# Patient Record
Sex: MALE | Race: WHITE | Employment: OTHER | URBAN - METROPOLITAN AREA
[De-identification: names, ages, dates, MRNs, and addresses within clinical notes are randomized per-mention and may not be internally consistent; named-entity substitution may affect disease eponyms.]

---

## 2020-06-08 ENCOUNTER — OFFICE VISIT (OUTPATIENT)
Dept: ORTHOPEDIC SURGERY | Age: 75
End: 2020-06-08
Payer: MEDICARE

## 2020-06-08 VITALS — BODY MASS INDEX: 34.82 KG/M2 | WEIGHT: 280 LBS | HEIGHT: 75 IN

## 2020-06-08 PROCEDURE — G8419 CALC BMI OUT NRM PARAM NOF/U: HCPCS | Performed by: PHYSICAL MEDICINE & REHABILITATION

## 2020-06-08 PROCEDURE — 4040F PNEUMOC VAC/ADMIN/RCVD: CPT | Performed by: PHYSICAL MEDICINE & REHABILITATION

## 2020-06-08 PROCEDURE — G8427 DOCREV CUR MEDS BY ELIG CLIN: HCPCS | Performed by: PHYSICAL MEDICINE & REHABILITATION

## 2020-06-08 PROCEDURE — 99203 OFFICE O/P NEW LOW 30 MIN: CPT | Performed by: PHYSICAL MEDICINE & REHABILITATION

## 2020-06-08 PROCEDURE — 1036F TOBACCO NON-USER: CPT | Performed by: PHYSICAL MEDICINE & REHABILITATION

## 2020-06-08 PROCEDURE — 1123F ACP DISCUSS/DSCN MKR DOCD: CPT | Performed by: PHYSICAL MEDICINE & REHABILITATION

## 2020-06-08 PROCEDURE — 3017F COLORECTAL CA SCREEN DOC REV: CPT | Performed by: PHYSICAL MEDICINE & REHABILITATION

## 2020-06-08 RX ORDER — CANDESARTAN 8 MG/1
TABLET ORAL
COMMUNITY
Start: 2020-03-29

## 2020-06-08 RX ORDER — METHYLPREDNISOLONE 4 MG/1
TABLET ORAL
Qty: 1 KIT | Refills: 0 | Status: SHIPPED | OUTPATIENT
Start: 2020-06-08 | End: 2020-06-14

## 2020-06-08 NOTE — PROGRESS NOTES
Take  by mouth., Disp: , Rfl:     vitamin D (CHOLECALCIFEROL) 1000 UNIT TABS tablet, Take 1,000 Units by mouth daily. , Disp: , Rfl:     diphenhydrAMINE (BENADRYL) 25 MG tablet, Take 25 mg by mouth every 6 hours as needed. , Disp: , Rfl:     tiZANidine (ZANAFLEX) 4 MG tablet, Take 1 tablet by mouth every 8 hours as needed. , Disp: 90 tablet, Rfl: 5    docusate sodium (COLACE) 100 MG capsule, Take 100 mg by mouth 2 times daily. , Disp: , Rfl:     candesartan (ATACAND) 8 MG tablet, , Disp: , Rfl:   Allergies:  Demerol; Daljit Legato; Benazepril hcl; Benicar [olmesartan medoxomil]; Hctz; and Succinylcholine chloride  Social History:    reports that he has never smoked. He has never used smokeless tobacco. He reports current alcohol use. He reports that he does not use drugs. Family History:   Family History   Problem Relation Age of Onset    Diabetes Neg Hx     High Cholesterol Neg Hx     High Blood Pressure Neg Hx        REVIEW OF SYSTEMS: Full ROS noted & scanned   CONSTITUTIONAL: Denies unexplained weight loss, fevers, chills or fatigue  NEUROLOGICAL: Denies unsteady gait or progressive weakness  MUSCULOSKELETAL: Denies joint swelling or redness  PSYCHOLOGICAL: Denies anxiety, depression   SKIN: Denies skin changes, delayed healing, rash, itching   HEMATOLOGIC: Denies easy bleeding or bruising  ENDOCRINE: Denies excessive thirst, urination, heat/cold  RESPIRATORY: Denies current dyspnea, cough  GI: Denies nausea, vomiting, diarrhea   : Denies bowel or bladder issues       PHYSICAL EXAM:    Vitals: Height 6' 3\" (1.905 m), weight 280 lb (127 kg). GENERAL EXAM:  · General Apparence: Patient is adequately groomed with no evidence of malnutrition. · Orientation: The patient is oriented to time, place and person. · Mood & Affect:The patient's mood and affect are appropriate   · Vascular: Examination reveals no swelling tenderness in upper or lower extremities.  Good capillary refill  · Lymphatic: The lymphatic examination bilaterally reveals all areas to be without enlargement or induration  · Sensation: Sensation is intact without deficit  · Coordination/Balance: Good coordination       LUMBAR/SACRAL EXAMINATION:  · Inspection: Local inspection shows no step-off or bruising. Lumbar alignment is normal.  Sagittal and Coronal balance is neutral.      · Palpation:   No evidence of tenderness at the midline. No tenderness bilaterally at the paraspinal or trochanters. There is no step-off or paraspinal spasm. · Range of Motion: Moderate loss flexion extension  · Strength:   Strength testing is 5/5 in all muscle groups tested. · Special Tests:   Straight leg raise and crossed SLR negative. Leg length and pelvis level.  0 out of 5 Shannon's signs. · Skin: There are no rashes, ulcerations or lesions. · Reflexes: Reflexes are symmetrically trace at the patellar and ankle tendons. Clonus absent bilaterally at the feet. · Gait & station: Normal heel toe gait  · Additional Examinations: Diminished right hip range of motion on the right without pain  · RIGHT LOWER EXTREMITY: Inspection/examination of the right lower extremity does not show any tenderness, deformity or injury. Range of motion is full. There is no gross instability. There are no rashes, ulcerations or lesions. Strength and tone are normal.  ·   · LEFT LOWER EXTREMITY:  Inspection/examination of the left lower extremity does not show any tenderness, deformity or injury. Range of motion is full. There is no gross instability. There are no rashes, ulcerations or lesions. Strength and tone are normal.    Diagnostic Testin views lumbar spine 2020 show advanced discogenic spondylosis; moderate bilateral hip OA    Impression:    Advanced lumbar discogenic spondylosis and clinical bilateral neurogenic claudication with significant disability        Plan:   Medrol Dosepak.   Discussed relative immunosuppression in setting of COVID-19 crisis    MRI lumbar spine.   Follow-up after          CARLOS Matias

## 2020-06-15 ENCOUNTER — HOSPITAL ENCOUNTER (OUTPATIENT)
Dept: MRI IMAGING | Age: 75
Discharge: HOME | End: 2020-06-15
Payer: MEDICARE

## 2020-06-15 PROCEDURE — 72148 MRI LUMBAR SPINE W/O DYE: CPT

## 2020-06-24 ENCOUNTER — OFFICE VISIT (OUTPATIENT)
Dept: ORTHOPEDIC SURGERY | Age: 75
End: 2020-06-24
Payer: MEDICARE

## 2020-06-24 ENCOUNTER — TELEPHONE (OUTPATIENT)
Dept: ORTHOPEDIC SURGERY | Age: 75
End: 2020-06-24

## 2020-06-24 VITALS — BODY MASS INDEX: 34.81 KG/M2 | WEIGHT: 279.98 LBS | HEIGHT: 75 IN

## 2020-06-24 PROCEDURE — G8427 DOCREV CUR MEDS BY ELIG CLIN: HCPCS | Performed by: PHYSICAL MEDICINE & REHABILITATION

## 2020-06-24 PROCEDURE — G8417 CALC BMI ABV UP PARAM F/U: HCPCS | Performed by: PHYSICAL MEDICINE & REHABILITATION

## 2020-06-24 PROCEDURE — 3017F COLORECTAL CA SCREEN DOC REV: CPT | Performed by: PHYSICAL MEDICINE & REHABILITATION

## 2020-06-24 PROCEDURE — 1036F TOBACCO NON-USER: CPT | Performed by: PHYSICAL MEDICINE & REHABILITATION

## 2020-06-24 PROCEDURE — 99214 OFFICE O/P EST MOD 30 MIN: CPT | Performed by: PHYSICAL MEDICINE & REHABILITATION

## 2020-06-24 PROCEDURE — 4040F PNEUMOC VAC/ADMIN/RCVD: CPT | Performed by: PHYSICAL MEDICINE & REHABILITATION

## 2020-06-24 PROCEDURE — 1123F ACP DISCUSS/DSCN MKR DOCD: CPT | Performed by: PHYSICAL MEDICINE & REHABILITATION

## 2020-06-24 RX ORDER — NITROGLYCERIN 0.4 MG/1
0.4 TABLET SUBLINGUAL EVERY 5 MIN PRN
COMMUNITY
Start: 2015-05-27 | End: 2020-06-24

## 2020-06-24 RX ORDER — PANTOPRAZOLE SODIUM 40 MG/1
TABLET, DELAYED RELEASE ORAL
COMMUNITY
Start: 2020-06-06

## 2020-06-24 RX ORDER — BRIMONIDINE TARTRATE 2 MG/ML
SOLUTION/ DROPS OPHTHALMIC
COMMUNITY
Start: 2020-05-14

## 2020-06-24 RX ORDER — LATANOPROST 50 UG/ML
SOLUTION/ DROPS OPHTHALMIC
COMMUNITY
Start: 2020-06-21

## 2020-06-24 NOTE — LETTER
[facility]  Surgery Precert & Billing Form:    DEMOGRAPHICS:                                                                                                       Patient Name:  Mohini Bar  Patient :  1945   Patient SS#:      Patient Phone:  [phone] (home)  Alt.  Patient Phone:    Patient Address:  Meir [address] [place] [address] [address] [address]    PCP:  Yeny Pyle MD  Insurance: MEDICARE    DIAGNOSIS & PROCEDURE:                                                                                      Diagnosis: C81.175  Operation: bilateral L2-3 X ALFREDO #1 NEW SERIES    SURGERY  INFORMATION  Date of Surgery:   2020  Location:   [facility]  Type:    OUTPATIENT  23 hour hold:  NO  Surgeon:          Eric Almendarez MD  20     BILLING INFORMATION:                                                                                                Physician Procedure                                            CPT Codes        BILATERAL L2-3 TRANSFORAMINAL ALFREDO #1   NEW SERIES                PA, or Fellow Procedure                                      CPT Codes

## 2020-06-29 NOTE — H&P
HISTORY AND PHYSICAL/PRE-SEDATION ASSESSMENT    Patient:  Jair Leiva   :  1945  Medical Record No.:  [id number]   Date:  2020  Physician:  Sarath Madden M.D. Facility: [facility]     Nursing History and Physical reviewed and agreed upon. Additional findings:    Allergies:  Demerol; Layvonne Jaja; Benazepril hcl; Benicar [olmesartan medoxomil]; Hctz; and Succinylcholine chloride    Home Medications:    Prior to Admission medications    Medication Sig Start Date End Date Taking? Authorizing Provider   brimonidine (ALPHAGAN) 0.2 % ophthalmic solution INT 1 GTT INTO OU BID 20   Historical Provider, MD   latanoprost (XALATAN) 0.005 % ophthalmic solution  20   Historical Provider, MD   pantoprazole (PROTONIX) 40 MG tablet  20   Historical Provider, MD   candesartan (ATACAND) 8 MG tablet  3/29/20   Historical Provider, MD   atorvastatin (LIPITOR) 80 MG tablet Take 80 mg by mouth daily    Historical Provider, MD   aspirin 81 MG tablet Take 81 mg by mouth daily    Historical Provider, MD   Multiple Vitamins-Minerals (THERAPEUTIC MULTIVITAMIN-MINERALS) tablet Take 1 tablet by mouth daily    Historical Provider, MD   HYDROcodone-acetaminophen (NORCO) 5-325 MG per tablet Take 1 tablet by mouth three times daily. 4/7/15   Emily Echols MD   gabapentin (NEURONTIN) 100 MG capsule Take 1 capsule by mouth daily (before lunch). 3/12/14   Emily Echols MD   FIBER PO Take  by mouth. Historical Provider, MD   vitamin D (CHOLECALCIFEROL) 1000 UNIT TABS tablet Take 1,000 Units by mouth daily. Historical Provider, MD   diphenhydrAMINE (BENADRYL) 25 MG tablet Take 25 mg by mouth every 6 hours as needed. Historical Provider, MD   tiZANidine (ZANAFLEX) 4 MG tablet Take 1 tablet by mouth every 8 hours as needed. 13   Emily Echols MD   Needles & Syringes MISC 21gauge 1 1/2 inch.   3cc.  13  Emily Echols MD   docusate sodium (COLACE) 100 MG

## 2020-06-30 ENCOUNTER — HOSPITAL ENCOUNTER (OUTPATIENT)
Age: 75
Setting detail: OUTPATIENT SURGERY
Discharge: HOME | End: 2020-06-30
Attending: PHYSICAL MEDICINE & REHABILITATION | Admitting: PHYSICAL MEDICINE & REHABILITATION
Payer: MEDICARE

## 2020-06-30 VITALS
SYSTOLIC BLOOD PRESSURE: 154 MMHG | WEIGHT: 280 LBS | TEMPERATURE: 97.2 F | OXYGEN SATURATION: 94 % | DIASTOLIC BLOOD PRESSURE: 72 MMHG | RESPIRATION RATE: 16 BRPM | HEIGHT: 75 IN | HEART RATE: 51 BPM | BODY MASS INDEX: 34.82 KG/M2

## 2020-06-30 PROCEDURE — 2709999900 HC NON-CHARGEABLE SUPPLY: Performed by: PHYSICAL MEDICINE & REHABILITATION

## 2020-06-30 PROCEDURE — 6360000002 HC RX W HCPCS: Performed by: PHYSICAL MEDICINE & REHABILITATION

## 2020-06-30 PROCEDURE — 3600000002 HC SURGERY LEVEL 2 BASE: Performed by: PHYSICAL MEDICINE & REHABILITATION

## 2020-06-30 PROCEDURE — 2500000003 HC RX 250 WO HCPCS: Performed by: PHYSICAL MEDICINE & REHABILITATION

## 2020-06-30 PROCEDURE — 7100000010 HC PHASE II RECOVERY - FIRST 15 MIN: Performed by: PHYSICAL MEDICINE & REHABILITATION

## 2020-06-30 PROCEDURE — 6360000004 HC RX CONTRAST MEDICATION: Performed by: PHYSICAL MEDICINE & REHABILITATION

## 2020-06-30 RX ORDER — DEXAMETHASONE SODIUM PHOSPHATE 10 MG/ML
INJECTION, SOLUTION INTRAMUSCULAR; INTRAVENOUS
Status: DISCONTINUED
Start: 2020-06-30 | End: 2020-06-30 | Stop reason: HOSPADM

## 2020-06-30 ASSESSMENT — PAIN - FUNCTIONAL ASSESSMENT
PAIN_FUNCTIONAL_ASSESSMENT: PREVENTS OR INTERFERES SOME ACTIVE ACTIVITIES AND ADLS
PAIN_FUNCTIONAL_ASSESSMENT: 0-10

## 2020-06-30 ASSESSMENT — PAIN DESCRIPTION - DESCRIPTORS: DESCRIPTORS: ACHING

## 2020-06-30 NOTE — PROGRESS NOTES
Pt provided with a mask upon arrival to the facility. Screenings completed. Pt checked in for procedure. Pt's  information provided on the chart.

## 2020-06-30 NOTE — PROGRESS NOTES
Pt ready for d/c home, instructions given and reviewed with pt, he denied questions, pt ambulated to private auto for d/c home in stable condition.

## 2020-07-06 ENCOUNTER — TELEPHONE (OUTPATIENT)
Dept: ORTHOPEDIC SURGERY | Age: 75
End: 2020-07-06

## 2020-07-06 RX ORDER — GABAPENTIN 300 MG/1
CAPSULE ORAL
COMMUNITY
Start: 2020-06-25

## 2020-07-06 NOTE — TELEPHONE ENCOUNTER
Mrs Cindy Rios called with C/o Mr. Cindy Rios having increased pain at 1 week S/p ALFREDO. They deny that there is any redness, hot/cold flashes or night sweats. I explained that this is not uncommon, and that they could use ice/heat to help and that I would check with [facility] on further recommendations. I spoke with [facility] and she suggested that he increase his A.M. gabapentin from 100mg to 300mg. I suggested that they also touch bases with his Dr. Anjana Michel prescribes the gabapentin so there is no confusion.     Mr. Cindy Rios voiced understanding

## 2020-07-14 ENCOUNTER — TELEPHONE (OUTPATIENT)
Dept: ORTHOPEDIC SURGERY | Age: 75
End: 2020-07-14

## 2020-07-14 ENCOUNTER — VIRTUAL VISIT (OUTPATIENT)
Dept: ORTHOPEDIC SURGERY | Age: 75
End: 2020-07-14
Payer: MEDICARE

## 2020-07-14 PROCEDURE — 99442 PR PHYS/QHP TELEPHONE EVALUATION 11-20 MIN: CPT | Performed by: PHYSICIAN ASSISTANT

## 2020-07-14 NOTE — PROGRESS NOTES
TELEPHONE VISIT: SPINE    CHIEF COMPLAINT:    Chief Complaint   Patient presents with    Back Pain       Patient provided verbal consent to proceed with telephone visit; aware he/she may receive a bill for this telephone service, depending on insurance coverage. The patient is being evaluated by a telephone encounter due to the restrictions of the COVID-19 pandemic. A caregiver was present when appropriate. All issues as below were discussed and addressed, but no physical exam was performed unless allowed by visual confirmation. If it was felt that patient should be evaluated in clinic then they were directed there. Due to this being a TeleHealth encounter (During NUAYS-[facility] public [facility] emergency), evaluation of the following organ systems was limited to: spine. Pursuant to the emergency declaration under the [facility] waiver authority and the [employer] Act, this Virtual Visit was conducted with patient's verbal consent, to reduce the risk of exposure to COVID-19 and provide necessary medical care. The patient (and/or legal guardian) has also been advised to contact this office for worsening conditions or problems, and seek emergency medical treatment and/or call 911 if deemed necessary. Individuals present during telemedicine consultation-[facility] & the patient     HISTORY OF PRESENT ILLNESS:                The patient is a 76 y.o. male consent granted for telephone visit to follow-up after bilateral L2-3 TX ALFREDO #1 from 6/30/2020 for acute/recurrent aching low back pain radiating into the right greater than left anterior lateral thigh/calves. He describes this as a \"burning\" sensation. He tells me that prior to the epidural injection his pain was a 6/10.   Following the epidural injection from June 30 to July 2 his pain was lessened to a 2 out of 10 until July 3 when he began experiencing severe intermittent pain. He reports significant pain with walking, standing or transitioning in and out of bed. He reports good relief with sitting resting or reclining. He states he previously was able to bike and is no longer able to at this time. He states he feels some leg weakness secondary to pain but denies any progressive extremity weakness. He denies any bowel or bladder incontinence. He denies any fevers chills or recent infections. He is currently taking Zanaflex and Norco 5/325 3 times daily PRN. He states he currently is having no difficulty sleeping. He states he never experienced symptoms like this with his prior epidural injections.       Past/Current Treatment     PT:   Chiro:  Injections:   12/16/13: Rt L3 TX ALFREDO  12/13/16: Rt L3-4 TX ALFREDO    6/30/2020 Bilateral L2-3 transforaminal epidural injection #1--relief for a few days following then worsening pain  Medications:            NSAIDS:             Muscle relaxer:   Zanaflex            Steriods:              Neuropathic medications:   Gabapentin            Opioids: Norco 5/325 3 times daily PRN            Other:  Surgery: Prior cervical surgery, Dr. Dm Ford history:  Past Medical History:   Diagnosis Date     Allergic Rhinnitis 5/16/2010    Bursitis, hip 5/14/2010    CAD (coronary artery disease)     denies any cardiac history    Degeneration of cervical intervertebral disc 5/16/2010    Dermatophytosis of nail 5/16/2010    Diverticulitis of colon (without mention of hemorrhage)(562.11) 5/16/2010    Essential Tremor 5/16/2010    Heart attack ([address]     Hematuria 5/16/2010    Hiatal hernia 5/16/2010    HYPERTENSION 5/16/2010    Hyperthyroidism 5/16/2010    Paroxysmal supraventricular tachycardia ([address] 5/16/2010    Personal history of other malignant neoplasm of skin 5/16/2010    Schatzki's ring 5/16/2010    Unspecified sleep apnea 5/16/2010       Past Surgical History:     Past Surgical History:   Procedure Laterality Will track L MRI results       Present during telephone call: Sandra Araujo [facility]    Total time spent on medical discussion/telephone visit: 20min    [facility]

## 2020-07-14 NOTE — TELEPHONE ENCOUNTER
[facility] . The order is in [address]

## 2020-07-15 ENCOUNTER — HOSPITAL ENCOUNTER (OUTPATIENT)
Dept: MRI IMAGING | Age: 75
Discharge: HOME | End: 2020-07-15
Payer: MEDICARE

## 2020-07-15 PROCEDURE — 72148 MRI LUMBAR SPINE W/O DYE: CPT

## 2020-07-16 ENCOUNTER — TELEPHONE (OUTPATIENT)
Dept: ORTHOPEDIC SURGERY | Age: 75
End: 2020-07-16

## 2020-07-16 NOTE — TELEPHONE ENCOUNTER
Attempted to reach pt re: updated L MRI 7---which is stable. I would still recommend consult w/Dr. Maldonado Lugo as planned.          Norberto Salazar, [facility]

## 2024-11-21 LAB
ANION GAP SERPL CALCULATED.3IONS-SCNC: 8 MMOL/L (ref 5–13)
APTT: 34.2 SECONDS (ref 23.1–37.6)
BUN / CREAT RATIO: 14
BUN BLDV-MCNC: 12 MG/DL (ref 7–25)
CALCIUM SERPL-MCNC: 8.8 MG/DL (ref 8.5–10.5)
CHLORIDE BLD-SCNC: 109 MMOL/L (ref 98–110)
CO2: 25 MMOL/L (ref 22–29)
COAGULATION TISSUE FACTOR INDUCED BLD TIME PT. COAG: 12.1 SECONDS (ref 10.5–14.1)
CREAT SERPL-MCNC: 0.84 MG/DL (ref 0.5–1.3)
EGFR (CKD-EPI): 89 SEE NOTE
GLUCOSE BLD-MCNC: 111 MG/DL (ref 71–99)
INR BLD: 1 (ref 0.9–1.1)
POTASSIUM SERPL-SCNC: 3.9 MMOL/L (ref 3.5–5.1)
SODIUM BLD-SCNC: 142 MMOL/L (ref 135–146)

## 2024-11-22 LAB
BILIRUBIN, URINE: NEGATIVE
BLOOD, URINE: NEGATIVE
CLARITY, UA: CLEAR
COLOR, UA: YELLOW
GLUCOSE URINE: NEGATIVE MG/DL
HCT VFR BLD CALC: 46.7 % (ref 40–51)
HEMOGLOBIN: 14.8 G/DL (ref 13.2–17.1)
KETONES, URINE: NEGATIVE MG/DL
LEUKOCYTE ESTERASE, URINE: NEGATIVE
LEUKOCYTES, BLD: 5.38 10*3/UL (ref 3.8–10.8)
MCH RBC QN AUTO: 30.5 PG (ref 27–33)
MCHC RBC AUTO-ENTMCNC: 31.7 G/DL (ref 30–36)
MCV RBC AUTO: 96.1 FL (ref 80–100)
NITRITE, URINE: NEGATIVE
PDW BLD-RTO: 13.3 % (ref 11–15)
PH, URINE: 5.5 (ref 5–8)
PLATELET # BLD: 196 10*3/UL (ref 140–400)
PMV BLD AUTO: 12.3 FL (ref 9–13)
PROTEIN UA: NEGATIVE MG/DL
RBC # BLD: 4.86 10*6/UL (ref 4.2–5.8)
SPECIFIC GRAVITY UA: 1.02 (ref 1–1.03)
UROBILINOGEN, URINE: 2 MG/DL

## (undated) DEVICE — UNIVERSAL BLOCK TRAY: Brand: MEDLINE INDUSTRIES, INC.

## (undated) DEVICE — GAUZE,SPONGE,4"X4",16PLY,STRL,LF,10/TRAY: Brand: MEDLINE

## (undated) DEVICE — STERILE POLYISOPRENE POWDER-FREE SURGICAL GLOVES: Brand: PROTEXIS

## (undated) DEVICE — TOWEL OR BLUEE 16X26IN ST 8 PACK ORB08 16X26ORTWL

## (undated) DEVICE — ALCOHOL RUBBING 16OZ 70% ISO

## (undated) DEVICE — CHLORAPREP 26ML ORANGE